# Patient Record
Sex: FEMALE
[De-identification: names, ages, dates, MRNs, and addresses within clinical notes are randomized per-mention and may not be internally consistent; named-entity substitution may affect disease eponyms.]

---

## 2018-03-21 ENCOUNTER — HOSPITAL ENCOUNTER (EMERGENCY)
Dept: HOSPITAL 92 - ERS | Age: 48
Discharge: HOME | End: 2018-03-21
Payer: COMMERCIAL
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
## 2018-03-21 DIAGNOSIS — M25.512: Primary | ICD-10-CM

## 2018-03-21 DIAGNOSIS — Z79.899: ICD-10-CM

## 2018-03-21 PROCEDURE — 96372 THER/PROPH/DIAG INJ SC/IM: CPT

## 2018-03-21 NOTE — RAD
RADIOGRAPH LEFT SHOULDER 3 VIEWS:

3/21/18

 

HISTORY: 

47-year-old female with two weeks of left shoulder pain.

 

FINDINGS:  

There is no fracture or dislocation. Minimal or mild degenerative changes at the glenohumeral joint a
nd AC joint. Faint, irregularly shaped subtle  calcifications in the soft tissues very close to the g
reater tuberosity. 

 

IMPRESSION:  

1.      Tiny calcifications in the rotator cuff could represent HADD (hydroxyapatite deposition disea
se) or CPPD (calcium pyrophosphate crystal deposition disease).

2.      Minimal/mild osteoarthrosis.

3.      No fracture. 

 

POS: [facility]

## 2018-11-02 ENCOUNTER — HOSPITAL ENCOUNTER (OUTPATIENT)
Dept: HOSPITAL 92 - BICMAMMO | Age: 48
Discharge: HOME | End: 2018-11-02
Attending: OBSTETRICS & GYNECOLOGY
Payer: COMMERCIAL

## 2018-11-02 DIAGNOSIS — Z12.31: Primary | ICD-10-CM

## 2018-11-02 PROCEDURE — 77063 BREAST TOMOSYNTHESIS BI: CPT

## 2018-11-02 PROCEDURE — 77067 SCR MAMMO BI INCL CAD: CPT

## 2019-12-26 ENCOUNTER — HOSPITAL ENCOUNTER (OUTPATIENT)
Dept: HOSPITAL 92 - BICMAMMO | Age: 49
Discharge: HOME | End: 2019-12-26
Attending: OBSTETRICS & GYNECOLOGY
Payer: COMMERCIAL

## 2019-12-26 DIAGNOSIS — Z91.89: ICD-10-CM

## 2019-12-26 DIAGNOSIS — Z12.31: Primary | ICD-10-CM

## 2019-12-26 PROCEDURE — 77063 BREAST TOMOSYNTHESIS BI: CPT

## 2019-12-26 PROCEDURE — 77067 SCR MAMMO BI INCL CAD: CPT

## 2019-12-26 NOTE — MMO
Bilateral MAMMO Bilat Screen [facility].

 

CLINICAL HISTORY:

Patient is 49 years old and is seen for screening. The patient has no family

history of breast cancer.  The patient has no personal history of cancer. The

patient has a history of right Excisional Biopsy in November, 2010 - benign.

 

VIEWS:

The views performed were:  bilateral craniocaudal with tomosynthesis and

bilateral mediolateral oblique with tomosynthesis.

 

FILMS COMPARED:

The present examination has been compared to prior imaging studies performed at

[facility] on 02/28/2013, 03/10/2015, 07/27/2016 and 11/02/2018.

 

This study has been interpreted with the assistance of computer-aided detection.

 

MAMMOGRAM FINDINGS:

The breasts are heterogeneously dense, which could obscure a lesion on

mammography.

 

There are stable benign appearing calcifications seen in both breasts.

 

There are no suspicious masses, suspicious calcifications, or new areas of

architectural distortion.

 

IMPRESSION:

THERE IS NO MAMMOGRAPHIC EVIDENCE OF MALIGNANCY.

 

A ROUTINE FOLLOW-UP MAMMOGRAM IN 1 YEAR IS RECOMMENDED.

 

THE RESULTS OF THIS EXAM WERE SENT TO THE PATIENT.

 

ACR BI-RADS Category 2 - Benign finding

 

MAMMOGRAPHY NOTE:

 1. A negative mammogram report should not delay a biopsy if a dominant of

 clinically suspicious mass is present.

 2. Approximately 10% to 15% of breast cancers are not detected by

 mammography.

 3. Adenosis and dense breasts may obscure an underlying neoplasm.

 

 

Reported by: KRIS NICHOLSON MD

Electonically Signed: [id number]